# Patient Record
Sex: MALE | Race: WHITE | NOT HISPANIC OR LATINO | Employment: FULL TIME | URBAN - NONMETROPOLITAN AREA
[De-identification: names, ages, dates, MRNs, and addresses within clinical notes are randomized per-mention and may not be internally consistent; named-entity substitution may affect disease eponyms.]

---

## 2021-09-30 ENCOUNTER — HOSPITAL ENCOUNTER (EMERGENCY)
Facility: HOSPITAL | Age: 31
End: 2021-10-01
Attending: EMERGENCY MEDICINE | Admitting: EMERGENCY MEDICINE

## 2021-09-30 DIAGNOSIS — Z00.8 MEDICAL CLEARANCE FOR PSYCHIATRIC ADMISSION: ICD-10-CM

## 2021-09-30 DIAGNOSIS — F22 PARANOIA (HCC): Primary | ICD-10-CM

## 2021-09-30 PROCEDURE — 99285 EMERGENCY DEPT VISIT HI MDM: CPT

## 2021-09-30 PROCEDURE — 82075 ASSAY OF BREATH ETHANOL: CPT | Performed by: EMERGENCY MEDICINE

## 2021-09-30 PROCEDURE — 99285 EMERGENCY DEPT VISIT HI MDM: CPT | Performed by: EMERGENCY MEDICINE

## 2021-10-01 ENCOUNTER — HOSPITAL ENCOUNTER (INPATIENT)
Facility: HOSPITAL | Age: 31
LOS: 11 days | Discharge: HOME | DRG: 753 | End: 2021-10-12
Attending: STUDENT IN AN ORGANIZED HEALTH CARE EDUCATION/TRAINING PROGRAM | Admitting: STUDENT IN AN ORGANIZED HEALTH CARE EDUCATION/TRAINING PROGRAM
Payer: COMMERCIAL

## 2021-10-01 VITALS
TEMPERATURE: 97.6 F | SYSTOLIC BLOOD PRESSURE: 140 MMHG | OXYGEN SATURATION: 96 % | WEIGHT: 194 LBS | DIASTOLIC BLOOD PRESSURE: 84 MMHG | RESPIRATION RATE: 20 BRPM | HEART RATE: 77 BPM

## 2021-10-01 DIAGNOSIS — G47.00 INSOMNIA, UNSPECIFIED TYPE: ICD-10-CM

## 2021-10-01 DIAGNOSIS — Z00.8 MEDICAL CLEARANCE FOR PSYCHIATRIC ADMISSION: ICD-10-CM

## 2021-10-01 DIAGNOSIS — F31.73 BIPOLAR DISORDER, IN PARTIAL REMISSION, MOST RECENT EPISODE MANIC (HCC): Primary | ICD-10-CM

## 2021-10-01 PROBLEM — Z72.0 TOBACCO ABUSE: Status: ACTIVE | Noted: 2021-10-01

## 2021-10-01 PROBLEM — F31.9 BIPOLAR DISORDER (HCC): Status: ACTIVE | Noted: 2021-10-01

## 2021-10-01 PROBLEM — F12.90 MARIJUANA USE: Status: ACTIVE | Noted: 2021-10-01

## 2021-10-01 LAB
ALBUMIN SERPL BCP-MCNC: 4 G/DL (ref 3.5–5)
ALP SERPL-CCNC: 77 U/L (ref 46–116)
ALT SERPL W P-5'-P-CCNC: 27 U/L (ref 12–78)
AMPHETAMINES SERPL QL SCN: NEGATIVE
ANION GAP SERPL CALCULATED.3IONS-SCNC: 4 MMOL/L (ref 4–13)
AST SERPL W P-5'-P-CCNC: 16 U/L (ref 5–45)
BARBITURATES UR QL: NEGATIVE
BASOPHILS # BLD AUTO: 0.06 THOUSANDS/ΜL (ref 0–0.1)
BASOPHILS NFR BLD AUTO: 1 % (ref 0–1)
BENZODIAZ UR QL: NEGATIVE
BILIRUB SERPL-MCNC: 0.28 MG/DL (ref 0.2–1)
BUN SERPL-MCNC: 7 MG/DL (ref 5–25)
CALCIUM SERPL-MCNC: 9.1 MG/DL (ref 8.3–10.1)
CHLORIDE SERPL-SCNC: 103 MMOL/L (ref 100–108)
CO2 SERPL-SCNC: 31 MMOL/L (ref 21–32)
COCAINE UR QL: NEGATIVE
CREAT SERPL-MCNC: 0.95 MG/DL (ref 0.6–1.3)
EOSINOPHIL # BLD AUTO: 0.05 THOUSAND/ΜL (ref 0–0.61)
EOSINOPHIL NFR BLD AUTO: 1 % (ref 0–6)
ERYTHROCYTE [DISTWIDTH] IN BLOOD BY AUTOMATED COUNT: 13.2 % (ref 11.6–15.1)
ETHANOL EXG-MCNC: NORMAL MG/DL
GFR SERPL CREATININE-BSD FRML MDRD: 108 ML/MIN/1.73SQ M
GLUCOSE SERPL-MCNC: 116 MG/DL (ref 65–140)
HCT VFR BLD AUTO: 42.5 % (ref 36.5–49.3)
HGB BLD-MCNC: 14.3 G/DL (ref 12–17)
IMM GRANULOCYTES # BLD AUTO: 0.02 THOUSAND/UL (ref 0–0.2)
IMM GRANULOCYTES NFR BLD AUTO: 0 % (ref 0–2)
LYMPHOCYTES # BLD AUTO: 2.87 THOUSANDS/ΜL (ref 0.6–4.47)
LYMPHOCYTES NFR BLD AUTO: 26 % (ref 14–44)
MCH RBC QN AUTO: 28.8 PG (ref 26.8–34.3)
MCHC RBC AUTO-ENTMCNC: 33.6 G/DL (ref 31.4–37.4)
MCV RBC AUTO: 86 FL (ref 82–98)
METHADONE UR QL: NEGATIVE
MONOCYTES # BLD AUTO: 1.05 THOUSAND/ΜL (ref 0.17–1.22)
MONOCYTES NFR BLD AUTO: 10 % (ref 4–12)
NEUTROPHILS # BLD AUTO: 6.86 THOUSANDS/ΜL (ref 1.85–7.62)
NEUTS SEG NFR BLD AUTO: 62 % (ref 43–75)
NRBC BLD AUTO-RTO: 0 /100 WBCS
OPIATES UR QL SCN: NEGATIVE
OXYCODONE+OXYMORPHONE UR QL SCN: NEGATIVE
PCP UR QL: NEGATIVE
PLATELET # BLD AUTO: 252 THOUSANDS/UL (ref 149–390)
PMV BLD AUTO: 8.4 FL (ref 8.9–12.7)
POTASSIUM SERPL-SCNC: 3.6 MMOL/L (ref 3.5–5.3)
PROT SERPL-MCNC: 7.4 G/DL (ref 6.4–8.2)
RBC # BLD AUTO: 4.96 MILLION/UL (ref 3.88–5.62)
SARS-COV-2 RNA RESP QL NAA+PROBE: NEGATIVE
SODIUM SERPL-SCNC: 138 MMOL/L (ref 136–145)
THC UR QL: POSITIVE
TSH SERPL DL<=0.05 MIU/L-ACNC: 1.23 UIU/ML (ref 0.36–3.74)
VALPROATE SERPL-MCNC: 47 UG/ML (ref 50–100)
WBC # BLD AUTO: 10.91 THOUSAND/UL (ref 4.31–10.16)

## 2021-10-01 PROCEDURE — 99252 IP/OBS CONSLTJ NEW/EST SF 35: CPT | Performed by: INTERNAL MEDICINE

## 2021-10-01 PROCEDURE — 84443 ASSAY THYROID STIM HORMONE: CPT | Performed by: EMERGENCY MEDICINE

## 2021-10-01 PROCEDURE — GZHZZZZ GROUP PSYCHOTHERAPY: ICD-10-PCS | Performed by: STUDENT IN AN ORGANIZED HEALTH CARE EDUCATION/TRAINING PROGRAM

## 2021-10-01 PROCEDURE — 80307 DRUG TEST PRSMV CHEM ANLYZR: CPT | Performed by: EMERGENCY MEDICINE

## 2021-10-01 PROCEDURE — U0005 INFEC AGEN DETEC AMPLI PROBE: HCPCS | Performed by: EMERGENCY MEDICINE

## 2021-10-01 PROCEDURE — 80053 COMPREHEN METABOLIC PANEL: CPT | Performed by: EMERGENCY MEDICINE

## 2021-10-01 PROCEDURE — 80164 ASSAY DIPROPYLACETIC ACD TOT: CPT | Performed by: EMERGENCY MEDICINE

## 2021-10-01 PROCEDURE — U0003 INFECTIOUS AGENT DETECTION BY NUCLEIC ACID (DNA OR RNA); SEVERE ACUTE RESPIRATORY SYNDROME CORONAVIRUS 2 (SARS-COV-2) (CORONAVIRUS DISEASE [COVID-19]), AMPLIFIED PROBE TECHNIQUE, MAKING USE OF HIGH THROUGHPUT TECHNOLOGIES AS DESCRIBED BY CMS-2020-01-R: HCPCS | Performed by: EMERGENCY MEDICINE

## 2021-10-01 PROCEDURE — 36415 COLL VENOUS BLD VENIPUNCTURE: CPT | Performed by: EMERGENCY MEDICINE

## 2021-10-01 PROCEDURE — 85025 COMPLETE CBC W/AUTO DIFF WBC: CPT | Performed by: EMERGENCY MEDICINE

## 2021-10-01 RX ORDER — HYDROXYZINE HYDROCHLORIDE 25 MG/1
25 TABLET, FILM COATED ORAL
Status: CANCELLED | OUTPATIENT
Start: 2021-10-01

## 2021-10-01 RX ORDER — HYDROXYZINE HYDROCHLORIDE 25 MG/1
100 TABLET, FILM COATED ORAL
Status: CANCELLED | OUTPATIENT
Start: 2021-10-01

## 2021-10-01 RX ORDER — OLANZAPINE 5 MG/1
5 TABLET ORAL
Status: DISCONTINUED | OUTPATIENT
Start: 2021-10-01 | End: 2021-10-12 | Stop reason: HOSPADM

## 2021-10-01 RX ORDER — MAGNESIUM HYDROXIDE/ALUMINUM HYDROXICE/SIMETHICONE 120; 1200; 1200 MG/30ML; MG/30ML; MG/30ML
30 SUSPENSION ORAL EVERY 4 HOURS PRN
Status: DISCONTINUED | OUTPATIENT
Start: 2021-10-01 | End: 2021-10-12 | Stop reason: HOSPADM

## 2021-10-01 RX ORDER — DIVALPROEX SODIUM 500 MG/1
500 TABLET, EXTENDED RELEASE ORAL
Status: DISCONTINUED | OUTPATIENT
Start: 2021-10-01 | End: 2021-10-04

## 2021-10-01 RX ORDER — OLANZAPINE 2.5 MG/1
5 TABLET ORAL
Status: CANCELLED | OUTPATIENT
Start: 2021-10-01

## 2021-10-01 RX ORDER — OLANZAPINE 10 MG/1
10 INJECTION, POWDER, LYOPHILIZED, FOR SOLUTION INTRAMUSCULAR
Status: CANCELLED | OUTPATIENT
Start: 2021-10-01

## 2021-10-01 RX ORDER — BENZTROPINE MESYLATE 1 MG/ML
1 INJECTION INTRAMUSCULAR; INTRAVENOUS
Status: DISCONTINUED | OUTPATIENT
Start: 2021-10-01 | End: 2021-10-12 | Stop reason: HOSPADM

## 2021-10-01 RX ORDER — POLYETHYLENE GLYCOL 3350 17 G/17G
17 POWDER, FOR SOLUTION ORAL DAILY PRN
Status: DISCONTINUED | OUTPATIENT
Start: 2021-10-01 | End: 2021-10-12 | Stop reason: HOSPADM

## 2021-10-01 RX ORDER — VALPROIC ACID 250 MG/1
500 CAPSULE, LIQUID FILLED ORAL ONCE
Status: DISCONTINUED | OUTPATIENT
Start: 2021-10-01 | End: 2021-10-01 | Stop reason: HOSPADM

## 2021-10-01 RX ORDER — HYDROXYZINE 50 MG/1
50 TABLET, FILM COATED ORAL
Status: DISCONTINUED | OUTPATIENT
Start: 2021-10-01 | End: 2021-10-12 | Stop reason: HOSPADM

## 2021-10-01 RX ORDER — IBUPROFEN 800 MG/1
800 TABLET ORAL EVERY 8 HOURS PRN
Status: CANCELLED | OUTPATIENT
Start: 2021-10-01

## 2021-10-01 RX ORDER — OLANZAPINE 10 MG/1
10 TABLET ORAL
Status: DISCONTINUED | OUTPATIENT
Start: 2021-10-01 | End: 2021-10-12 | Stop reason: HOSPADM

## 2021-10-01 RX ORDER — IBUPROFEN 400 MG/1
800 TABLET ORAL EVERY 8 HOURS PRN
Status: DISCONTINUED | OUTPATIENT
Start: 2021-10-01 | End: 2021-10-12 | Stop reason: HOSPADM

## 2021-10-01 RX ORDER — LORAZEPAM 1 MG/1
2 TABLET ORAL ONCE
Status: COMPLETED | OUTPATIENT
Start: 2021-10-01 | End: 2021-10-01

## 2021-10-01 RX ORDER — DIPHENHYDRAMINE HYDROCHLORIDE 50 MG/ML
50 INJECTION INTRAMUSCULAR; INTRAVENOUS EVERY 6 HOURS PRN
Status: CANCELLED | OUTPATIENT
Start: 2021-10-01

## 2021-10-01 RX ORDER — OLANZAPINE 2.5 MG/1
2.5 TABLET ORAL
Status: DISCONTINUED | OUTPATIENT
Start: 2021-10-01 | End: 2021-10-12 | Stop reason: HOSPADM

## 2021-10-01 RX ORDER — BENZTROPINE MESYLATE 1 MG/1
1 TABLET ORAL
Status: DISCONTINUED | OUTPATIENT
Start: 2021-10-01 | End: 2021-10-12 | Stop reason: HOSPADM

## 2021-10-01 RX ORDER — MAGNESIUM HYDROXIDE/ALUMINUM HYDROXICE/SIMETHICONE 120; 1200; 1200 MG/30ML; MG/30ML; MG/30ML
30 SUSPENSION ORAL EVERY 4 HOURS PRN
Status: CANCELLED | OUTPATIENT
Start: 2021-10-01

## 2021-10-01 RX ORDER — BENZTROPINE MESYLATE 1 MG/ML
1 INJECTION INTRAMUSCULAR; INTRAVENOUS
Status: CANCELLED | OUTPATIENT
Start: 2021-10-01

## 2021-10-01 RX ORDER — OLANZAPINE 2.5 MG/1
2.5 TABLET ORAL
Status: CANCELLED | OUTPATIENT
Start: 2021-10-01

## 2021-10-01 RX ORDER — LANOLIN ALCOHOL/MO/W.PET/CERES
3 CREAM (GRAM) TOPICAL
Status: CANCELLED | OUTPATIENT
Start: 2021-10-01

## 2021-10-01 RX ORDER — DIVALPROEX SODIUM 250 MG/1
250 TABLET, DELAYED RELEASE ORAL
COMMUNITY
Start: 2021-09-15 | End: 2021-10-12 | Stop reason: HOSPADM

## 2021-10-01 RX ORDER — LANOLIN ALCOHOL/MO/W.PET/CERES
3 CREAM (GRAM) TOPICAL
Status: DISCONTINUED | OUTPATIENT
Start: 2021-10-01 | End: 2021-10-12 | Stop reason: HOSPADM

## 2021-10-01 RX ORDER — MINERAL OIL AND PETROLATUM 150; 830 MG/G; MG/G
1 OINTMENT OPHTHALMIC
Status: DISCONTINUED | OUTPATIENT
Start: 2021-10-01 | End: 2021-10-12 | Stop reason: HOSPADM

## 2021-10-01 RX ORDER — QUETIAPINE FUMARATE 50 MG/1
50 TABLET, FILM COATED ORAL
Status: DISCONTINUED | OUTPATIENT
Start: 2021-10-01 | End: 2021-10-02

## 2021-10-01 RX ORDER — MINERAL OIL AND PETROLATUM 150; 830 MG/G; MG/G
1 OINTMENT OPHTHALMIC
Status: CANCELLED | OUTPATIENT
Start: 2021-10-01

## 2021-10-01 RX ORDER — HYDROXYZINE HYDROCHLORIDE 25 MG/1
25 TABLET, FILM COATED ORAL
Status: DISCONTINUED | OUTPATIENT
Start: 2021-10-01 | End: 2021-10-12 | Stop reason: HOSPADM

## 2021-10-01 RX ORDER — IBUPROFEN 400 MG/1
400 TABLET ORAL EVERY 4 HOURS PRN
Status: DISCONTINUED | OUTPATIENT
Start: 2021-10-01 | End: 2021-10-12 | Stop reason: HOSPADM

## 2021-10-01 RX ORDER — LORAZEPAM 2 MG/ML
2 INJECTION INTRAMUSCULAR EVERY 6 HOURS PRN
Status: CANCELLED | OUTPATIENT
Start: 2021-10-01

## 2021-10-01 RX ORDER — OLANZAPINE 10 MG/1
5 INJECTION, POWDER, LYOPHILIZED, FOR SOLUTION INTRAMUSCULAR
Status: CANCELLED | OUTPATIENT
Start: 2021-10-01

## 2021-10-01 RX ORDER — TRAZODONE HYDROCHLORIDE 50 MG/1
50 TABLET ORAL
Status: CANCELLED | OUTPATIENT
Start: 2021-10-01

## 2021-10-01 RX ORDER — HYDROXYZINE HYDROCHLORIDE 25 MG/1
50 TABLET, FILM COATED ORAL
Status: CANCELLED | OUTPATIENT
Start: 2021-10-01

## 2021-10-01 RX ORDER — HYDROXYZINE 50 MG/1
100 TABLET, FILM COATED ORAL
Status: DISCONTINUED | OUTPATIENT
Start: 2021-10-01 | End: 2021-10-12 | Stop reason: HOSPADM

## 2021-10-01 RX ORDER — VALPROIC ACID 250 MG/1
250 CAPSULE, LIQUID FILLED ORAL ONCE
Status: COMPLETED | OUTPATIENT
Start: 2021-10-01 | End: 2021-10-01

## 2021-10-01 RX ORDER — QUETIAPINE FUMARATE 25 MG/1
50 TABLET, FILM COATED ORAL ONCE
Status: COMPLETED | OUTPATIENT
Start: 2021-10-01 | End: 2021-10-01

## 2021-10-01 RX ORDER — TRAZODONE HYDROCHLORIDE 50 MG/1
50 TABLET ORAL
Status: DISCONTINUED | OUTPATIENT
Start: 2021-10-01 | End: 2021-10-12 | Stop reason: HOSPADM

## 2021-10-01 RX ORDER — PROPRANOLOL HYDROCHLORIDE 20 MG/1
10 TABLET ORAL EVERY 8 HOURS PRN
Status: CANCELLED | OUTPATIENT
Start: 2021-10-01

## 2021-10-01 RX ORDER — IBUPROFEN 600 MG/1
600 TABLET ORAL EVERY 6 HOURS PRN
Status: DISCONTINUED | OUTPATIENT
Start: 2021-10-01 | End: 2021-10-12 | Stop reason: HOSPADM

## 2021-10-01 RX ORDER — BENZTROPINE MESYLATE 1 MG/1
1 TABLET ORAL
Status: CANCELLED | OUTPATIENT
Start: 2021-10-01

## 2021-10-01 RX ORDER — OLANZAPINE 10 MG/1
10 TABLET ORAL
Status: CANCELLED | OUTPATIENT
Start: 2021-10-01

## 2021-10-01 RX ORDER — QUETIAPINE FUMARATE 50 MG/1
50 TABLET, FILM COATED ORAL
COMMUNITY
Start: 2021-09-15 | End: 2021-10-12 | Stop reason: HOSPADM

## 2021-10-01 RX ORDER — POLYETHYLENE GLYCOL 3350 17 G/17G
17 POWDER, FOR SOLUTION ORAL DAILY PRN
Status: CANCELLED | OUTPATIENT
Start: 2021-10-01

## 2021-10-01 RX ORDER — AMOXICILLIN 250 MG
1 CAPSULE ORAL DAILY PRN
Status: CANCELLED | OUTPATIENT
Start: 2021-10-01

## 2021-10-01 RX ORDER — OLANZAPINE 10 MG/1
5 INJECTION, POWDER, LYOPHILIZED, FOR SOLUTION INTRAMUSCULAR
Status: DISCONTINUED | OUTPATIENT
Start: 2021-10-01 | End: 2021-10-12 | Stop reason: HOSPADM

## 2021-10-01 RX ORDER — LORAZEPAM 2 MG/ML
2 INJECTION INTRAMUSCULAR EVERY 6 HOURS PRN
Status: DISCONTINUED | OUTPATIENT
Start: 2021-10-01 | End: 2021-10-12 | Stop reason: HOSPADM

## 2021-10-01 RX ORDER — IBUPROFEN 400 MG/1
400 TABLET ORAL EVERY 4 HOURS PRN
Status: CANCELLED | OUTPATIENT
Start: 2021-10-01

## 2021-10-01 RX ORDER — NICOTINE 21 MG/24HR
1 PATCH, TRANSDERMAL 24 HOURS TRANSDERMAL DAILY
Status: CANCELLED | OUTPATIENT
Start: 2021-10-01

## 2021-10-01 RX ORDER — OLANZAPINE 10 MG/1
10 INJECTION, POWDER, LYOPHILIZED, FOR SOLUTION INTRAMUSCULAR
Status: DISCONTINUED | OUTPATIENT
Start: 2021-10-01 | End: 2021-10-12 | Stop reason: HOSPADM

## 2021-10-01 RX ORDER — DIVALPROEX SODIUM 500 MG/1
500 TABLET, DELAYED RELEASE ORAL
COMMUNITY
Start: 2021-09-15 | End: 2021-10-12 | Stop reason: HOSPADM

## 2021-10-01 RX ORDER — AMOXICILLIN 250 MG
1 CAPSULE ORAL DAILY PRN
Status: DISCONTINUED | OUTPATIENT
Start: 2021-10-01 | End: 2021-10-12 | Stop reason: HOSPADM

## 2021-10-01 RX ORDER — DIPHENHYDRAMINE HYDROCHLORIDE 50 MG/ML
50 INJECTION INTRAMUSCULAR; INTRAVENOUS EVERY 6 HOURS PRN
Status: DISCONTINUED | OUTPATIENT
Start: 2021-10-01 | End: 2021-10-12 | Stop reason: HOSPADM

## 2021-10-01 RX ORDER — NICOTINE 21 MG/24HR
14 PATCH, TRANSDERMAL 24 HOURS TRANSDERMAL ONCE
Status: DISCONTINUED | OUTPATIENT
Start: 2021-10-01 | End: 2021-10-01 | Stop reason: HOSPADM

## 2021-10-01 RX ORDER — NICOTINE 21 MG/24HR
1 PATCH, TRANSDERMAL 24 HOURS TRANSDERMAL DAILY
Status: DISCONTINUED | OUTPATIENT
Start: 2021-10-02 | End: 2021-10-01

## 2021-10-01 RX ORDER — IBUPROFEN 600 MG/1
600 TABLET ORAL EVERY 6 HOURS PRN
Status: CANCELLED | OUTPATIENT
Start: 2021-10-01

## 2021-10-01 RX ORDER — PROPRANOLOL HYDROCHLORIDE 10 MG/1
10 TABLET ORAL EVERY 8 HOURS PRN
Status: DISCONTINUED | OUTPATIENT
Start: 2021-10-01 | End: 2021-10-12 | Stop reason: HOSPADM

## 2021-10-01 RX ADMIN — NICOTINE 14 MG: 14 PATCH, EXTENDED RELEASE TRANSDERMAL at 04:14

## 2021-10-01 RX ADMIN — NICOTINE POLACRILEX 2 MG: 2 GUM, CHEWING ORAL at 21:23

## 2021-10-01 RX ADMIN — QUETIAPINE FUMARATE 50 MG: 50 TABLET ORAL at 21:06

## 2021-10-01 RX ADMIN — VALPROIC ACID 250 MG: 250 CAPSULE ORAL at 09:15

## 2021-10-01 RX ADMIN — LORAZEPAM 2 MG: 1 TABLET ORAL at 13:11

## 2021-10-01 RX ADMIN — QUETIAPINE FUMARATE 50 MG: 25 TABLET ORAL at 04:32

## 2021-10-01 RX ADMIN — DIVALPROEX SODIUM 500 MG: 500 TABLET, EXTENDED RELEASE ORAL at 21:06

## 2021-10-02 PROBLEM — F31.11 BIPOLAR 1 DISORDER, MANIC, MILD (HCC): Status: ACTIVE | Noted: 2021-10-02

## 2021-10-02 LAB
25(OH)D3 SERPL-MCNC: 24.3 NG/ML (ref 30–100)
CHOLEST SERPL-MCNC: 126 MG/DL
FOLATE SERPL-MCNC: 17.7 NG/ML (ref 3.1–17.5)
HDLC SERPL-MCNC: 32 MG/DL
LDLC SERPL CALC-MCNC: 69 MG/DL
NONHDLC SERPL-MCNC: 94 MG/DL
TRIGL SERPL-MCNC: 126 MG/DL
VIT B12 SERPL-MCNC: 369 PG/ML (ref 100–900)

## 2021-10-02 PROCEDURE — 82306 VITAMIN D 25 HYDROXY: CPT | Performed by: STUDENT IN AN ORGANIZED HEALTH CARE EDUCATION/TRAINING PROGRAM

## 2021-10-02 PROCEDURE — 86592 SYPHILIS TEST NON-TREP QUAL: CPT | Performed by: STUDENT IN AN ORGANIZED HEALTH CARE EDUCATION/TRAINING PROGRAM

## 2021-10-02 PROCEDURE — 82607 VITAMIN B-12: CPT | Performed by: STUDENT IN AN ORGANIZED HEALTH CARE EDUCATION/TRAINING PROGRAM

## 2021-10-02 PROCEDURE — 82746 ASSAY OF FOLIC ACID SERUM: CPT | Performed by: STUDENT IN AN ORGANIZED HEALTH CARE EDUCATION/TRAINING PROGRAM

## 2021-10-02 PROCEDURE — 80061 LIPID PANEL: CPT | Performed by: STUDENT IN AN ORGANIZED HEALTH CARE EDUCATION/TRAINING PROGRAM

## 2021-10-02 PROCEDURE — 93005 ELECTROCARDIOGRAM TRACING: CPT

## 2021-10-02 PROCEDURE — 99222 1ST HOSP IP/OBS MODERATE 55: CPT | Performed by: PSYCHIATRY & NEUROLOGY

## 2021-10-02 RX ORDER — DIVALPROEX SODIUM 250 MG/1
250 TABLET, EXTENDED RELEASE ORAL DAILY
Status: DISCONTINUED | OUTPATIENT
Start: 2021-10-03 | End: 2021-10-05

## 2021-10-02 RX ORDER — QUETIAPINE FUMARATE 100 MG/1
100 TABLET, FILM COATED ORAL
Status: DISCONTINUED | OUTPATIENT
Start: 2021-10-02 | End: 2021-10-04

## 2021-10-02 RX ADMIN — DIVALPROEX SODIUM 500 MG: 500 TABLET, EXTENDED RELEASE ORAL at 21:05

## 2021-10-02 RX ADMIN — NICOTINE POLACRILEX 2 MG: 2 GUM, CHEWING ORAL at 16:28

## 2021-10-02 RX ADMIN — QUETIAPINE FUMARATE 100 MG: 100 TABLET ORAL at 21:05

## 2021-10-02 RX ADMIN — HYDROXYZINE HYDROCHLORIDE 100 MG: 50 TABLET, FILM COATED ORAL at 13:49

## 2021-10-02 RX ADMIN — NICOTINE POLACRILEX 2 MG: 2 GUM, CHEWING ORAL at 06:26

## 2021-10-02 RX ADMIN — OLANZAPINE 10 MG: 10 TABLET, FILM COATED ORAL at 18:12

## 2021-10-03 LAB
ATRIAL RATE: 70 BPM
P AXIS: 39 DEGREES
PR INTERVAL: 152 MS
QRS AXIS: 66 DEGREES
QRSD INTERVAL: 102 MS
QT INTERVAL: 396 MS
QTC INTERVAL: 427 MS
T WAVE AXIS: 47 DEGREES
VENTRICULAR RATE: 70 BPM

## 2021-10-03 PROCEDURE — 99232 SBSQ HOSP IP/OBS MODERATE 35: CPT | Performed by: PSYCHIATRY & NEUROLOGY

## 2021-10-03 PROCEDURE — 93010 ELECTROCARDIOGRAM REPORT: CPT | Performed by: INTERNAL MEDICINE

## 2021-10-03 RX ADMIN — NICOTINE POLACRILEX 2 MG: 2 GUM, CHEWING ORAL at 09:10

## 2021-10-03 RX ADMIN — NICOTINE POLACRILEX 2 MG: 2 GUM, CHEWING ORAL at 10:17

## 2021-10-03 RX ADMIN — DIVALPROEX SODIUM 250 MG: 250 TABLET, EXTENDED RELEASE ORAL at 09:10

## 2021-10-03 RX ADMIN — DIVALPROEX SODIUM 500 MG: 500 TABLET, EXTENDED RELEASE ORAL at 20:24

## 2021-10-03 RX ADMIN — QUETIAPINE FUMARATE 100 MG: 100 TABLET ORAL at 20:25

## 2021-10-03 RX ADMIN — NICOTINE POLACRILEX 2 MG: 2 GUM, CHEWING ORAL at 21:39

## 2021-10-03 RX ADMIN — NICOTINE POLACRILEX 2 MG: 2 GUM, CHEWING ORAL at 12:51

## 2021-10-03 RX ADMIN — NICOTINE POLACRILEX 2 MG: 2 GUM, CHEWING ORAL at 16:07

## 2021-10-03 RX ADMIN — NICOTINE POLACRILEX 2 MG: 2 GUM, CHEWING ORAL at 18:54

## 2021-10-04 LAB — RPR SER QL: NORMAL

## 2021-10-04 PROCEDURE — 99232 SBSQ HOSP IP/OBS MODERATE 35: CPT | Performed by: STUDENT IN AN ORGANIZED HEALTH CARE EDUCATION/TRAINING PROGRAM

## 2021-10-04 RX ORDER — QUETIAPINE FUMARATE 100 MG/1
100 TABLET, FILM COATED ORAL 2 TIMES DAILY
Status: DISCONTINUED | OUTPATIENT
Start: 2021-10-04 | End: 2021-10-06

## 2021-10-04 RX ORDER — DIVALPROEX SODIUM 500 MG/1
500 TABLET, EXTENDED RELEASE ORAL
Status: DISCONTINUED | OUTPATIENT
Start: 2021-10-04 | End: 2021-10-05

## 2021-10-04 RX ADMIN — QUETIAPINE FUMARATE 100 MG: 100 TABLET ORAL at 21:00

## 2021-10-04 RX ADMIN — MELATONIN TAB 3 MG 3 MG: 3 TAB at 21:00

## 2021-10-04 RX ADMIN — DIVALPROEX SODIUM 500 MG: 500 TABLET, EXTENDED RELEASE ORAL at 21:00

## 2021-10-04 RX ADMIN — QUETIAPINE FUMARATE 100 MG: 100 TABLET ORAL at 12:37

## 2021-10-04 RX ADMIN — HYDROXYZINE HYDROCHLORIDE 50 MG: 50 TABLET, FILM COATED ORAL at 02:25

## 2021-10-04 RX ADMIN — NICOTINE POLACRILEX 2 MG: 2 GUM, CHEWING ORAL at 06:18

## 2021-10-04 RX ADMIN — NICOTINE POLACRILEX 2 MG: 2 GUM, CHEWING ORAL at 15:28

## 2021-10-04 RX ADMIN — NICOTINE POLACRILEX 2 MG: 2 GUM, CHEWING ORAL at 18:51

## 2021-10-04 RX ADMIN — NICOTINE POLACRILEX 2 MG: 2 GUM, CHEWING ORAL at 16:50

## 2021-10-04 RX ADMIN — DIVALPROEX SODIUM 250 MG: 250 TABLET, EXTENDED RELEASE ORAL at 08:10

## 2021-10-04 RX ADMIN — NICOTINE POLACRILEX 2 MG: 2 GUM, CHEWING ORAL at 09:25

## 2021-10-05 LAB — VALPROATE SERPL-MCNC: 26 UG/ML (ref 50–120)

## 2021-10-05 PROCEDURE — 99232 SBSQ HOSP IP/OBS MODERATE 35: CPT | Performed by: STUDENT IN AN ORGANIZED HEALTH CARE EDUCATION/TRAINING PROGRAM

## 2021-10-05 PROCEDURE — 80164 ASSAY DIPROPYLACETIC ACD TOT: CPT | Performed by: PSYCHIATRY & NEUROLOGY

## 2021-10-05 RX ORDER — DIVALPROEX SODIUM 500 MG/1
1000 TABLET, EXTENDED RELEASE ORAL
Status: DISCONTINUED | OUTPATIENT
Start: 2021-10-05 | End: 2021-10-10

## 2021-10-05 RX ADMIN — DIVALPROEX SODIUM 250 MG: 250 TABLET, EXTENDED RELEASE ORAL at 08:19

## 2021-10-05 RX ADMIN — DIVALPROEX SODIUM 1000 MG: 500 TABLET, EXTENDED RELEASE ORAL at 20:00

## 2021-10-05 RX ADMIN — NICOTINE POLACRILEX 2 MG: 2 GUM, CHEWING ORAL at 20:00

## 2021-10-05 RX ADMIN — NICOTINE POLACRILEX 2 MG: 2 GUM, CHEWING ORAL at 07:32

## 2021-10-05 RX ADMIN — NICOTINE POLACRILEX 2 MG: 2 GUM, CHEWING ORAL at 05:18

## 2021-10-05 RX ADMIN — MELATONIN TAB 3 MG 3 MG: 3 TAB at 21:14

## 2021-10-05 RX ADMIN — QUETIAPINE FUMARATE 100 MG: 100 TABLET ORAL at 12:50

## 2021-10-05 RX ADMIN — QUETIAPINE FUMARATE 100 MG: 100 TABLET ORAL at 20:00

## 2021-10-05 RX ADMIN — NICOTINE POLACRILEX 2 MG: 2 GUM, CHEWING ORAL at 13:49

## 2021-10-05 RX ADMIN — NICOTINE POLACRILEX 2 MG: 2 GUM, CHEWING ORAL at 22:06

## 2021-10-05 RX ADMIN — NICOTINE POLACRILEX 2 MG: 2 GUM, CHEWING ORAL at 15:59

## 2021-10-05 RX ADMIN — NICOTINE POLACRILEX 2 MG: 2 GUM, CHEWING ORAL at 09:41

## 2021-10-06 PROCEDURE — 99232 SBSQ HOSP IP/OBS MODERATE 35: CPT | Performed by: STUDENT IN AN ORGANIZED HEALTH CARE EDUCATION/TRAINING PROGRAM

## 2021-10-06 RX ORDER — QUETIAPINE FUMARATE 100 MG/1
100 TABLET, FILM COATED ORAL 2 TIMES DAILY
Status: DISCONTINUED | OUTPATIENT
Start: 2021-10-06 | End: 2021-10-08

## 2021-10-06 RX ADMIN — NICOTINE POLACRILEX 2 MG: 2 GUM, CHEWING ORAL at 17:31

## 2021-10-06 RX ADMIN — NICOTINE POLACRILEX 2 MG: 2 GUM, CHEWING ORAL at 12:42

## 2021-10-06 RX ADMIN — MELATONIN TAB 3 MG 3 MG: 3 TAB at 21:03

## 2021-10-06 RX ADMIN — NICOTINE POLACRILEX 2 MG: 2 GUM, CHEWING ORAL at 09:59

## 2021-10-06 RX ADMIN — NICOTINE POLACRILEX 2 MG: 2 GUM, CHEWING ORAL at 20:04

## 2021-10-06 RX ADMIN — DIVALPROEX SODIUM 1000 MG: 500 TABLET, EXTENDED RELEASE ORAL at 20:03

## 2021-10-06 RX ADMIN — QUETIAPINE FUMARATE 100 MG: 100 TABLET ORAL at 12:32

## 2021-10-06 RX ADMIN — OLANZAPINE 10 MG: 10 TABLET, FILM COATED ORAL at 08:11

## 2021-10-06 RX ADMIN — NICOTINE POLACRILEX 2 MG: 2 GUM, CHEWING ORAL at 07:06

## 2021-10-06 RX ADMIN — QUETIAPINE FUMARATE 100 MG: 100 TABLET ORAL at 20:03

## 2021-10-07 PROCEDURE — 99232 SBSQ HOSP IP/OBS MODERATE 35: CPT | Performed by: STUDENT IN AN ORGANIZED HEALTH CARE EDUCATION/TRAINING PROGRAM

## 2021-10-07 RX ADMIN — QUETIAPINE FUMARATE 100 MG: 100 TABLET ORAL at 20:04

## 2021-10-07 RX ADMIN — NICOTINE POLACRILEX 2 MG: 2 GUM, CHEWING ORAL at 17:26

## 2021-10-07 RX ADMIN — NICOTINE POLACRILEX 2 MG: 2 GUM, CHEWING ORAL at 10:14

## 2021-10-07 RX ADMIN — DIVALPROEX SODIUM 1000 MG: 500 TABLET, EXTENDED RELEASE ORAL at 20:04

## 2021-10-07 RX ADMIN — OLANZAPINE 10 MG: 10 TABLET, FILM COATED ORAL at 11:38

## 2021-10-07 RX ADMIN — NICOTINE POLACRILEX 2 MG: 2 GUM, CHEWING ORAL at 19:30

## 2021-10-07 RX ADMIN — NICOTINE POLACRILEX 2 MG: 2 GUM, CHEWING ORAL at 06:57

## 2021-10-07 RX ADMIN — QUETIAPINE FUMARATE 100 MG: 100 TABLET ORAL at 08:01

## 2021-10-07 RX ADMIN — MELATONIN TAB 3 MG 3 MG: 3 TAB at 21:15

## 2021-10-08 PROCEDURE — 99232 SBSQ HOSP IP/OBS MODERATE 35: CPT | Performed by: STUDENT IN AN ORGANIZED HEALTH CARE EDUCATION/TRAINING PROGRAM

## 2021-10-08 RX ORDER — QUETIAPINE FUMARATE 100 MG/1
100 TABLET, FILM COATED ORAL 3 TIMES DAILY
Status: DISCONTINUED | OUTPATIENT
Start: 2021-10-08 | End: 2021-10-12 | Stop reason: HOSPADM

## 2021-10-08 RX ADMIN — NICOTINE POLACRILEX 2 MG: 2 GUM, CHEWING ORAL at 16:59

## 2021-10-08 RX ADMIN — QUETIAPINE FUMARATE 100 MG: 100 TABLET ORAL at 08:06

## 2021-10-08 RX ADMIN — NICOTINE POLACRILEX 2 MG: 2 GUM, CHEWING ORAL at 08:06

## 2021-10-08 RX ADMIN — QUETIAPINE FUMARATE 100 MG: 100 TABLET ORAL at 20:17

## 2021-10-08 RX ADMIN — MELATONIN TAB 3 MG 3 MG: 3 TAB at 21:39

## 2021-10-08 RX ADMIN — DIVALPROEX SODIUM 1000 MG: 500 TABLET, EXTENDED RELEASE ORAL at 20:17

## 2021-10-08 RX ADMIN — NICOTINE POLACRILEX 2 MG: 2 GUM, CHEWING ORAL at 20:19

## 2021-10-08 RX ADMIN — LORAZEPAM 2 MG: 2 INJECTION INTRAMUSCULAR; INTRAVENOUS at 08:23

## 2021-10-08 RX ADMIN — NICOTINE POLACRILEX 2 MG: 2 GUM, CHEWING ORAL at 05:58

## 2021-10-08 RX ADMIN — QUETIAPINE FUMARATE 100 MG: 100 TABLET ORAL at 14:15

## 2021-10-08 RX ADMIN — NICOTINE POLACRILEX 2 MG: 2 GUM, CHEWING ORAL at 14:58

## 2021-10-08 RX ADMIN — NICOTINE POLACRILEX 2 MG: 2 GUM, CHEWING ORAL at 12:03

## 2021-10-09 LAB — VALPROATE SERPL-MCNC: 36.4 UG/ML (ref 50–120)

## 2021-10-09 PROCEDURE — 80164 ASSAY DIPROPYLACETIC ACD TOT: CPT

## 2021-10-09 PROCEDURE — 99232 SBSQ HOSP IP/OBS MODERATE 35: CPT | Performed by: STUDENT IN AN ORGANIZED HEALTH CARE EDUCATION/TRAINING PROGRAM

## 2021-10-09 RX ADMIN — QUETIAPINE FUMARATE 100 MG: 100 TABLET ORAL at 20:50

## 2021-10-09 RX ADMIN — MELATONIN TAB 3 MG 3 MG: 3 TAB at 20:50

## 2021-10-09 RX ADMIN — NICOTINE POLACRILEX 2 MG: 2 GUM, CHEWING ORAL at 16:36

## 2021-10-09 RX ADMIN — QUETIAPINE FUMARATE 100 MG: 100 TABLET ORAL at 14:40

## 2021-10-09 RX ADMIN — DIVALPROEX SODIUM 1000 MG: 500 TABLET, EXTENDED RELEASE ORAL at 20:49

## 2021-10-09 RX ADMIN — NICOTINE POLACRILEX 2 MG: 2 GUM, CHEWING ORAL at 09:01

## 2021-10-09 RX ADMIN — NICOTINE POLACRILEX 2 MG: 2 GUM, CHEWING ORAL at 21:08

## 2021-10-09 RX ADMIN — QUETIAPINE FUMARATE 100 MG: 100 TABLET ORAL at 09:01

## 2021-10-09 RX ADMIN — NICOTINE POLACRILEX 2 MG: 2 GUM, CHEWING ORAL at 05:40

## 2021-10-10 PROCEDURE — 99232 SBSQ HOSP IP/OBS MODERATE 35: CPT | Performed by: STUDENT IN AN ORGANIZED HEALTH CARE EDUCATION/TRAINING PROGRAM

## 2021-10-10 RX ORDER — MELATONIN
1000 DAILY
Status: DISCONTINUED | OUTPATIENT
Start: 2021-10-10 | End: 2021-10-12 | Stop reason: HOSPADM

## 2021-10-10 RX ADMIN — NICOTINE POLACRILEX 2 MG: 2 GUM, CHEWING ORAL at 08:30

## 2021-10-10 RX ADMIN — CHOLECALCIFEROL TAB 25 MCG (1000 UNIT) 1000 UNITS: 25 TAB at 13:13

## 2021-10-10 RX ADMIN — NICOTINE POLACRILEX 2 MG: 2 GUM, CHEWING ORAL at 16:36

## 2021-10-10 RX ADMIN — DIVALPROEX SODIUM 1250 MG: 500 TABLET, EXTENDED RELEASE ORAL at 21:00

## 2021-10-10 RX ADMIN — NICOTINE POLACRILEX 2 MG: 2 GUM, CHEWING ORAL at 11:09

## 2021-10-10 RX ADMIN — NICOTINE POLACRILEX 2 MG: 2 GUM, CHEWING ORAL at 19:34

## 2021-10-10 RX ADMIN — NICOTINE POLACRILEX 2 MG: 2 GUM, CHEWING ORAL at 13:14

## 2021-10-10 RX ADMIN — QUETIAPINE FUMARATE 100 MG: 100 TABLET ORAL at 21:00

## 2021-10-10 RX ADMIN — IBUPROFEN 600 MG: 600 TABLET, FILM COATED ORAL at 14:54

## 2021-10-10 RX ADMIN — QUETIAPINE FUMARATE 100 MG: 100 TABLET ORAL at 08:30

## 2021-10-10 RX ADMIN — MELATONIN TAB 3 MG 3 MG: 3 TAB at 21:08

## 2021-10-10 RX ADMIN — NICOTINE POLACRILEX 2 MG: 2 GUM, CHEWING ORAL at 06:26

## 2021-10-10 RX ADMIN — QUETIAPINE FUMARATE 100 MG: 100 TABLET ORAL at 13:13

## 2021-10-11 LAB
ALBUMIN SERPL BCP-MCNC: 4.1 G/DL (ref 3–5.2)
ALP SERPL-CCNC: 55 U/L (ref 43–122)
ALT SERPL W P-5'-P-CCNC: 17 U/L
AST SERPL W P-5'-P-CCNC: 25 U/L (ref 17–59)
BASOPHILS # BLD AUTO: 0 THOUSANDS/ΜL (ref 0–0.1)
BASOPHILS NFR BLD AUTO: 0 % (ref 0–1)
BILIRUB DIRECT SERPL-MCNC: 0.35 MG/DL
BILIRUB SERPL-MCNC: 0.58 MG/DL
EOSINOPHIL # BLD AUTO: 0.1 THOUSAND/ΜL (ref 0–0.4)
EOSINOPHIL NFR BLD AUTO: 2 % (ref 0–6)
ERYTHROCYTE [DISTWIDTH] IN BLOOD BY AUTOMATED COUNT: 14.5 %
HCT VFR BLD AUTO: 41.8 % (ref 41–53)
HGB BLD-MCNC: 14.2 G/DL (ref 13.5–17.5)
LYMPHOCYTES # BLD AUTO: 2.2 THOUSANDS/ΜL (ref 0.5–4)
LYMPHOCYTES NFR BLD AUTO: 30 % (ref 25–45)
MCH RBC QN AUTO: 29.2 PG (ref 26–34)
MCHC RBC AUTO-ENTMCNC: 34.1 G/DL (ref 31–36)
MCV RBC AUTO: 86 FL (ref 80–100)
MONOCYTES # BLD AUTO: 0.8 THOUSAND/ΜL (ref 0.2–0.9)
MONOCYTES NFR BLD AUTO: 10 % (ref 1–10)
NEUTROPHILS # BLD AUTO: 4.3 THOUSANDS/ΜL (ref 1.8–7.8)
NEUTS SEG NFR BLD AUTO: 58 % (ref 45–65)
PLATELET # BLD AUTO: 219 THOUSANDS/UL (ref 150–450)
PMV BLD AUTO: 7 FL (ref 8.9–12.7)
PROT SERPL-MCNC: 6.8 G/DL (ref 5.9–8.4)
RBC # BLD AUTO: 4.87 MILLION/UL (ref 4.5–5.9)
WBC # BLD AUTO: 7.4 THOUSAND/UL (ref 4.5–11)

## 2021-10-11 PROCEDURE — 85025 COMPLETE CBC W/AUTO DIFF WBC: CPT | Performed by: PSYCHIATRY & NEUROLOGY

## 2021-10-11 PROCEDURE — 99232 SBSQ HOSP IP/OBS MODERATE 35: CPT | Performed by: STUDENT IN AN ORGANIZED HEALTH CARE EDUCATION/TRAINING PROGRAM

## 2021-10-11 PROCEDURE — 80076 HEPATIC FUNCTION PANEL: CPT | Performed by: PSYCHIATRY & NEUROLOGY

## 2021-10-11 RX ADMIN — QUETIAPINE FUMARATE 100 MG: 100 TABLET ORAL at 19:55

## 2021-10-11 RX ADMIN — NICOTINE POLACRILEX 2 MG: 2 GUM, CHEWING ORAL at 07:42

## 2021-10-11 RX ADMIN — CHOLECALCIFEROL TAB 25 MCG (1000 UNIT) 1000 UNITS: 25 TAB at 08:30

## 2021-10-11 RX ADMIN — MELATONIN TAB 3 MG 3 MG: 3 TAB at 21:20

## 2021-10-11 RX ADMIN — QUETIAPINE FUMARATE 100 MG: 100 TABLET ORAL at 14:03

## 2021-10-11 RX ADMIN — DIVALPROEX SODIUM 1250 MG: 500 TABLET, EXTENDED RELEASE ORAL at 19:55

## 2021-10-11 RX ADMIN — NICOTINE POLACRILEX 2 MG: 2 GUM, CHEWING ORAL at 19:55

## 2021-10-11 RX ADMIN — QUETIAPINE FUMARATE 100 MG: 100 TABLET ORAL at 08:30

## 2021-10-11 RX ADMIN — NICOTINE POLACRILEX 2 MG: 2 GUM, CHEWING ORAL at 05:42

## 2021-10-11 RX ADMIN — NICOTINE POLACRILEX 2 MG: 2 GUM, CHEWING ORAL at 12:57

## 2021-10-11 RX ADMIN — NICOTINE POLACRILEX 2 MG: 2 GUM, CHEWING ORAL at 17:48

## 2021-10-12 VITALS
RESPIRATION RATE: 16 BRPM | WEIGHT: 202.6 LBS | TEMPERATURE: 97.9 F | BODY MASS INDEX: 26 KG/M2 | HEIGHT: 74 IN | OXYGEN SATURATION: 100 % | DIASTOLIC BLOOD PRESSURE: 63 MMHG | SYSTOLIC BLOOD PRESSURE: 112 MMHG | HEART RATE: 74 BPM

## 2021-10-12 PROBLEM — Z00.8 MEDICAL CLEARANCE FOR PSYCHIATRIC ADMISSION: Status: RESOLVED | Noted: 2021-10-01 | Resolved: 2021-10-12

## 2021-10-12 PROBLEM — G47.00 INSOMNIA: Status: ACTIVE | Noted: 2021-10-12

## 2021-10-12 PROCEDURE — 99238 HOSP IP/OBS DSCHRG MGMT 30/<: CPT | Performed by: STUDENT IN AN ORGANIZED HEALTH CARE EDUCATION/TRAINING PROGRAM

## 2021-10-12 RX ORDER — DIVALPROEX SODIUM 250 MG/1
1250 TABLET, EXTENDED RELEASE ORAL
Qty: 150 TABLET | Refills: 1 | Status: SHIPPED | OUTPATIENT
Start: 2021-10-12

## 2021-10-12 RX ORDER — LANOLIN ALCOHOL/MO/W.PET/CERES
3 CREAM (GRAM) TOPICAL
Qty: 30 TABLET | Refills: 1 | Status: SHIPPED | OUTPATIENT
Start: 2021-10-12

## 2021-10-12 RX ORDER — QUETIAPINE FUMARATE 100 MG/1
100 TABLET, FILM COATED ORAL 3 TIMES DAILY
Qty: 90 TABLET | Refills: 1 | Status: SHIPPED | OUTPATIENT
Start: 2021-10-12

## 2021-10-12 RX ADMIN — NICOTINE POLACRILEX 2 MG: 2 GUM, CHEWING ORAL at 06:28

## 2021-10-12 RX ADMIN — NICOTINE POLACRILEX 2 MG: 2 GUM, CHEWING ORAL at 12:15

## 2021-10-12 RX ADMIN — QUETIAPINE FUMARATE 100 MG: 100 TABLET ORAL at 08:25

## 2021-10-12 RX ADMIN — NICOTINE POLACRILEX 2 MG: 2 GUM, CHEWING ORAL at 08:55

## 2021-10-12 RX ADMIN — IBUPROFEN 600 MG: 600 TABLET, FILM COATED ORAL at 09:33

## 2021-10-12 RX ADMIN — CHOLECALCIFEROL TAB 25 MCG (1000 UNIT) 1000 UNITS: 25 TAB at 08:26

## 2022-06-27 ENCOUNTER — HOSPITAL ENCOUNTER (EMERGENCY)
Facility: HOSPITAL | Age: 32
Discharge: HOME | End: 2022-06-27
Attending: EMERGENCY MEDICINE | Admitting: EMERGENCY MEDICINE
Payer: OTHER MISCELLANEOUS

## 2022-06-27 VITALS
TEMPERATURE: 98 F | HEART RATE: 113 BPM | DIASTOLIC BLOOD PRESSURE: 90 MMHG | OXYGEN SATURATION: 98 % | RESPIRATION RATE: 16 BRPM | SYSTOLIC BLOOD PRESSURE: 145 MMHG

## 2022-06-27 DIAGNOSIS — Z78.9 DIPHTHERIA-PERTUSSIS-TETANUS (DPT) VACCINATION NOT UP TO DATE: ICD-10-CM

## 2022-06-27 DIAGNOSIS — T14.8XXA DEEP WOUND OF SKIN DUE TO AVULSION: Primary | ICD-10-CM
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
## 2022-06-27 PROCEDURE — 90471 IMMUNIZATION ADMIN: CPT

## 2022-06-27 PROCEDURE — 90715 TDAP VACCINE 7 YRS/> IM: CPT | Performed by: PHYSICIAN ASSISTANT

## 2022-06-27 PROCEDURE — 99284 EMERGENCY DEPT VISIT MOD MDM: CPT | Performed by: PHYSICIAN ASSISTANT

## 2022-06-27 PROCEDURE — 99283 EMERGENCY DEPT VISIT LOW MDM: CPT

## 2022-06-27 RX ORDER — GINSENG 100 MG
1 CAPSULE ORAL DAILY
Qty: 28 G | Refills: 0 | Status: SHIPPED | OUTPATIENT
Start: 2022-06-27

## 2022-06-27 RX ORDER — LIDOCAINE HYDROCHLORIDE 10 MG/ML
10 INJECTION, SOLUTION EPIDURAL; INFILTRATION; INTRACAUDAL; PERINEURAL ONCE
Status: DISCONTINUED | OUTPATIENT
Start: 2022-06-27 | End: 2022-06-27 | Stop reason: HOSPADM

## 2022-06-27 RX ADMIN — TETANUS TOXOID, REDUCED DIPHTHERIA TOXOID AND ACELLULAR PERTUSSIS VACCINE, ADSORBED 0.5 ML: 5; 2.5; 8; 8; 2.5 SUSPENSION INTRAMUSCULAR at 13:41

## 2022-06-27 NOTE — ED PROVIDER NOTES
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 Taking? QUEtiapine (SEROquel) 100 mg tablet   No No   Sig: Take 1 tablet (100 mg total) by mouth 3 (three) times a day   divalproex sodium (DEPAKOTE ER) 250 mg 24 hr tablet   No No   Sig: Take 5 tablets (1,250 mg total) by mouth daily after dinner   melatonin 3 mg   No No   Sig: Take 1 tablet (3 mg total) by mouth daily at bedtime      Facility-Administered Medications: None       Past Medical History:   Diagnosis Date    Bipolar disorder ([facility] [address] )     Depression     Psychiatric illness        History reviewed  No pertinent surgical history  History reviewed  No pertinent family history  I have reviewed and agree with the history as documented  E-Cigarette/Vaping    E-Cigarette Use Current Every Day User      E-Cigarette/Vaping Substances    Nicotine Yes      Social History     Tobacco Use    Smoking status: Current Some Day Smoker     Packs/day: 0 50     Types: Cigarettes    Smokeless tobacco: Never Used   Vaping Use    Vaping Use: Every day    Substances: Nicotine   Substance Use Topics    Alcohol use: Never    Drug use: Yes     Types: Marijuana       Review of Systems   Constitutional: Negative for activity change, chills, fatigue, fever and unexpected weight change  HENT: Negative for congestion, postnasal drip and rhinorrhea  Eyes: Negative for visual disturbance     Respiratory: Negative for cough, chest tightness, shortness of breath, wheezing and stridor  Cardiovascular: Negative for chest pain and leg swelling  Gastrointestinal: Negative for abdominal pain, diarrhea, nausea and vomiting  Endocrine: Negative for cold intolerance and heat intolerance  Genitourinary: Negative for flank pain  Musculoskeletal: Negative for arthralgias, joint swelling and myalgias  Left distal thumb skin avulsion   Skin: Negative for color change  Allergic/Immunologic: Negative for environmental allergies  Neurological: Negative for syncope and light-headedness  Hematological: Negative for adenopathy  Psychiatric/Behavioral: Negative for confusion  Physical Exam  Physical Exam  Vitals and nursing note reviewed  Constitutional:       Appearance: He is well-developed  HENT:      Head: Normocephalic and atraumatic  Eyes:      Conjunctiva/sclera: Conjunctivae normal    Cardiovascular:      Rate and Rhythm: Normal rate and regular rhythm  Heart sounds: No murmur heard  Pulmonary:      Effort: Pulmonary effort is normal  No respiratory distress  Breath sounds: Normal breath sounds  Abdominal:      Palpations: Abdomen is soft  Tenderness: There is no abdominal tenderness  Musculoskeletal:      Left hand: Laceration (Qualified as left thumb distal tip skin avulsion) present  No swelling or deformity  Normal strength  Normal sensation  Normal capillary refill  Normal pulse  Cervical back: Neck supple  Skin:     General: Skin is warm and dry  Neurological:      Mental Status: He is alert           Vital Signs  ED Triage Vitals [06/27/22 1237]   Temperature Pulse Respirations Blood Pressure SpO2   98 °F (36 7 °C) (!) 113 16 145/90 98 %      Temp Source Heart Rate Source Patient Position - Orthostatic VS BP Location FiO2 (%)   Temporal Monitor Sitting Right arm --      Pain Score       1           Vitals:    06/27/22 1237   BP: 145/90   Pulse: (!) 113 Patient Position - Orthostatic VS: Sitting         Visual Acuity      ED Medications  Medications   tetanus-diphtheria-acellular pertussis (BOOSTRIX) IM injection 0 5 mL (0 5 mL Intramuscular Given 6/27/22 1341)       Diagnostic Studies  Results Reviewed     None                 No orders to display              Procedures  General Procedure    Date/Time: 6/27/2022 1:07 PM  Performed by: Yaritza Merritt PA-C  Authorized by: Yaritza Merritt PA-C     Patient location:  ED  Assisting Provider(s): No    Consent:     Consent obtained:  Verbal    Consent given by:  Patient    Risks discussed:  Bleeding, infection and pain  Anesthesia (see MAR for exact dosages): Anesthesia method:  None  Procedure Detail:     Procedure note (site, laterality, method, findings):  Left thumb distal tip wound dressing with surgifoam, 2 x 2, and gauze dressing          ED Course                               SBIRT 20yo+    Flowsheet Row Most Recent Value   SBIRT (25 yo +)    In order to provide better care to our patients, we are screening all of our patients for alcohol and drug use  Would it be okay to ask you these screening questions? Yes Filed at: 06/27/2022 1357   Initial Alcohol Screen: US AUDIT-C     1  How often do you have a drink containing alcohol? 0 Filed at: 06/27/2022 1357   2  How many drinks containing alcohol do you have on a typical day you are drinking? 0 Filed at: 06/27/2022 1357   3a  Male UNDER 65: How often do you have five or more drinks on one occasion? 0 Filed at: 06/27/2022 1357   3b  FEMALE Any Age, or MALE 65+: How often do you have 4 or more drinks on one occassion? 0 Filed at: 06/27/2022 1357   Audit-C Score 0 Filed at: 06/27/2022 1357   LORY: How many times in the past year have you    Used an illegal drug or used a prescription medication for non-medical reasons?  Never Filed at: 06/27/2022 1357                    MDM  Number of Diagnoses or Management Options  Diphtheria-pertussis-tetanus (DPT) vaccination not up to date: minor  Knife wound: minor      Disposition  Final diagnoses:   Deep wound of skin due to avulsion   Knife wound   Diphtheria-pertussis-tetanus (DPT) vaccination not up to date     Time reflects when diagnosis was documented in both MDM as applicable and the Disposition within this note     Time User Action Codes Description Comment    6/27/2022  1:45 PM Efe Callowayda  [address]  8XXA] Deep wound of skin due to avulsion     6/27/2022  1:46 PM Gagan Daniel Add [W13  0XXA] Knife wound     6/27/2022  1:49 PM Gagan Daniel Add [Z78 9] Diphtheria-pertussis-tetanus (DPT) vaccination not up to date       ED Disposition     ED Disposition   Discharge    Condition   Good    Date/Time   Mon Jun 27, 2022  1:49 PM    Comment   Pino John discharge to home/self care  Follow-up Information     Follow up With Specialties Details Why Contact Info Additional Ina Muro [address] [facility] Emergency Medicine Go to  If development of signs and symptoms of infection  Wound will self heal with wound granulation over course of 2-4 weeks   [address] [place], [id number]  [address] [facility], [address] [place] [address], [facility] [address]          Discharge Medication List as of 6/27/2022  1:50 PM      START taking these medications    Details   bacitracin topical ointment 500 units/g topical ointment Apply 1 large application topically in the morning, Starting Mon 6/27/2022, Normal         CONTINUE these medications which have NOT CHANGED    Details   divalproex sodium (DEPAKOTE ER) 250 mg 24 hr tablet Take 5 tablets (1,250 mg total) by mouth daily after dinner, Starting Tue 10/12/2021, Normal      melatonin 3 mg Take 1 tablet (3 mg total) by mouth daily at bedtime, Starting Tue 10/12/2021, Normal      QUEtiapine (SEROquel) 100 mg tablet Take 1 tablet (100 mg total) by mouth 3 (three) times a day, Starting Tue 10/12/2021, Normal             No discharge procedures on file      PDMP Review       Value Time User    PDMP Reviewed  Yes 10/12/2021  9:52 AM Deneen Alexander MD          ED Provider  Electronically Signed by           Marya Valencia PA-C  06/28/22 5038

## 2023-09-01 ENCOUNTER — HOSPITAL ENCOUNTER (EMERGENCY)
Facility: HOSPITAL | Age: 33
Discharge: HOME | End: 2023-09-02
Attending: EMERGENCY MEDICINE
Payer: OTHER MISCELLANEOUS

## 2023-09-01 ENCOUNTER — APPOINTMENT (EMERGENCY)
Dept: RADIOLOGY | Facility: HOSPITAL | Age: 33
End: 2023-09-01
Payer: OTHER MISCELLANEOUS

## 2023-09-01 VITALS
HEART RATE: 70 BPM | SYSTOLIC BLOOD PRESSURE: 148 MMHG | TEMPERATURE: 98.4 F | OXYGEN SATURATION: 100 % | RESPIRATION RATE: 18 BRPM | DIASTOLIC BLOOD PRESSURE: 79 MMHG

## 2023-09-01 DIAGNOSIS — S63.611A SPRAIN OF LEFT INDEX FINGER, INITIAL ENCOUNTER: Primary | ICD-10-CM

## 2023-09-01 PROCEDURE — 73140 X-RAY EXAM OF FINGER(S): CPT

## 2023-09-01 PROCEDURE — 99284 EMERGENCY DEPT VISIT MOD MDM: CPT | Performed by: EMERGENCY MEDICINE

## 2023-09-01 PROCEDURE — 99283 EMERGENCY DEPT VISIT LOW MDM: CPT

## 2023-09-01 PROCEDURE — NC001 PR NO CHARGE: Performed by: EMERGENCY MEDICINE

## 2023-09-01 RX ORDER — IBUPROFEN 400 MG/1
400 TABLET ORAL ONCE
Status: COMPLETED | OUTPATIENT
Start: 2023-09-02 | End: 2023-09-02

## 2023-09-01 NOTE — Clinical Note
Sara Casey was seen and treated in our emergency department on 9/1/2023.            no use of left hand until follow-up    Diagnosis:     Megha Young  . He may return on this date: If you have any questions or concerns, please don't hesitate to call.       Aditi Campa, DO    ______________________________           _______________          _______________  Hospital Representative                              Date                                Time

## 2023-09-02 RX ADMIN — IBUPROFEN 400 MG: 400 TABLET, FILM COATED ORAL at 00:04

## 2023-09-02 NOTE — ED PROVIDER NOTES
History  Chief Complaint   Patient presents with   • Finger Pain     L 2nd finger pain. 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 States has been swelling and is unable to fully straighten it since. Denies meds for pain     Patient complains of left index finger swelling and limited ROM about 4-6 hours now. He was working at [employer] and lifted a plate when he felt a twinge at the finger. He was able to keep working but then couple hours later noticed pain swelling and limited ROM of that finger around the PIP joint. He did not take any medication. Prior to Admission Medications   Prescriptions Last Dose Informant Patient Reported? Taking? QUEtiapine (SEROquel) 100 mg tablet   No No   Sig: Take 1 tablet (100 mg total) by mouth 3 (three) times a day   bacitracin topical ointment 500 units/g topical ointment   No No   Sig: Apply 1 large application topically in the morning   divalproex sodium (DEPAKOTE ER) 250 mg 24 hr tablet   No No   Sig: Take 5 tablets (1,250 mg total) by mouth daily after dinner   melatonin 3 mg   No No   Sig: Take 1 tablet (3 mg total) by mouth daily at bedtime      Facility-Administered Medications: None       Past Medical History:   Diagnosis Date   • Bipolar disorder ([address])    • Depression    • Psychiatric illness        History reviewed. No pertinent surgical history. History reviewed. No pertinent family history. I have reviewed and agree with the history as documented.     E-Cigarette/Vaping   • E-Cigarette Use Current Every Day User      E-Cigarette/Vaping Substances   • Nicotine Yes      Social History     Tobacco Use   • Smoking status: Some Days     Packs/day: 0.50     Types: Cigarettes   • Smokeless tobacco: Never   Vaping Use   • Vaping Use: Every day   • Substances: Nicotine   Substance Use Topics   • Alcohol use: Never   • Drug use: Yes     Types: Marijuana       Review of Systems   Constitutional: Negative for chills and fever.   HENT: Negative for rhinorrhea and sore throat. Respiratory: Negative for shortness of breath. Cardiovascular: Negative for chest pain. Gastrointestinal: Negative for constipation, diarrhea, nausea and vomiting. Genitourinary: Negative for dysuria and frequency. Skin: Negative for rash. All other systems reviewed and are negative. Physical Exam  Physical Exam  Vitals and nursing note reviewed. Constitutional:       Appearance: He is well-developed. HENT:      Head: Normocephalic and atraumatic. Right Ear: External ear normal.      Left Ear: External ear normal.      Nose: Nose normal.   Eyes:      Conjunctiva/sclera: Conjunctivae normal.      Pupils: Pupils are equal, round, and reactive to light. Cardiovascular:      Rate and Rhythm: Normal rate and regular rhythm. Heart sounds: Normal heart sounds. Pulmonary:      Effort: Pulmonary effort is normal. No respiratory distress. Breath sounds: Normal breath sounds. No wheezing. Abdominal:      General: Bowel sounds are normal. There is no distension. Palpations: Abdomen is soft. Tenderness: There is no abdominal tenderness. Musculoskeletal:      Left hand: Swelling, deformity and tenderness present. Decreased range of motion. Normal strength. Normal sensation. Normal capillary refill. Normal pulse. Hands:       Cervical back: Normal range of motion and neck supple. No spinous process tenderness. Skin:     General: Skin is warm and dry. Findings: No rash. Neurological:      General: No focal deficit present. Mental Status: He is alert. GCS: GCS eye subscore is 4. GCS verbal subscore is 5. GCS motor subscore is 6. Sensory: No sensory deficit.    Psychiatric:         Mood and Affect: Mood normal.         Vital Signs  ED Triage Vitals [09/01/23 2326]   Temperature Pulse Respirations Blood Pressure SpO2   98.4 °F (36.9 °C) 70 18 148/79 100 %      Temp Source Heart Rate Source Patient Position - Orthostatic VS BP Location FiO2 (%)   Oral Monitor Sitting Right arm --      Pain Score       No Pain           Vitals:    09/01/23 2326   BP: 148/79   Pulse: 70   Patient Position - Orthostatic VS: Sitting         Visual Acuity      ED Medications  Medications   ibuprofen (MOTRIN) tablet 400 mg (400 mg Oral Given 9/2/23 0004)       Diagnostic Studies  Results Reviewed     None                 XR finger second digit - index LEFT    (Results Pending)              Procedures  Splint application    Date/Time: 9/2/2023 12:02 AM    Performed by: Timo Monreal DO  Authorized by: Timo Monreal DO  [place] Protocol:  Consent: Verbal consent obtained. Consent given by: patient      Pre-procedure details:     Sensation:  Numbness  Procedure details:     Laterality:  Left    Location:  Finger    Finger:  L index finger    Splint type:  Finger splint, static    Supplies:  Aluminum splint             ED Course                               SBIRT 20yo+    Flowsheet Row Most Recent Value   Initial Alcohol Screen: US AUDIT-C     1. How often do you have a drink containing alcohol? 0 Filed at: 09/01/2023 2328   2. How many drinks containing alcohol do you have on a typical day you are drinking? 0 Filed at: 09/01/2023 2328   3a. Male UNDER 65: How often do you have five or more drinks on one occasion? 0 Filed at: 09/01/2023 2328   Audit-C Score 0 Filed at: 09/01/2023 2328   LORY: How many times in the past year have you. .. Used an illegal drug or used a prescription medication for non-medical reasons? Never Filed at: 09/01/2023 2328                    Medical Decision Making  Differential includes left index finger PIP joint sprain. Less likely extensor tendon laceration or partial tear. Amount and/or Complexity of Data Reviewed  Radiology: ordered. Risk  Prescription drug management.           Disposition  Final diagnoses:   Sprain of left index finger, initial encounter - PIP joint     Time reflects when diagnosis was documented in both MDM as applicable and the Disposition within this note     Time User Action Codes Description Comment    9/2/2023 12:01 AM Michelle Sumner Add [M95.160T] Sprain of left index finger, initial encounter     9/2/2023 12:01 AM Michelle Sumner Modify [T87.192P] Sprain of left index finger, initial encounter PIP joint      ED Disposition     ED Disposition   Discharge    Condition   Stable    Date/Time   Sat Sep 2, 2023 12:01 AM    Comment   Joanna Rubalcava discharge to home/self care.                Follow-up Information     Follow up With Specialties Details Why Contact Info Additional Information    [address] Specialists [address] [facility] Orthopedic Surgery Call   [address] [facility] [phone]  [facility] [place] [facility] [address] [facility], [address] [facility] [address] [place] [address] [address] [place], [place], [address]          Discharge Medication List as of 9/2/2023 12:03 AM      CONTINUE these medications which have NOT CHANGED    Details   bacitracin topical ointment 500 units/g topical ointment Apply 1 large application topically in the morning, Starting Mon 6/27/2022, Normal      divalproex sodium (DEPAKOTE ER) 250 mg 24 hr tablet Take 5 tablets (1,250 mg total) by mouth daily after dinner, Starting Tue 10/12/2021, Normal      melatonin 3 mg Take 1 tablet (3 mg total) by mouth daily at bedtime, Starting Tue 10/12/2021, Normal      QUEtiapine (SEROquel) 100 mg tablet Take 1 tablet (100 mg total) by mouth 3 (three) times a day, Starting Tue 10/12/2021, Normal                 PDMP Review       Value Time User    PDMP Reviewed  Yes 10/12/2021  9:52 AM Rigoberto Wells MD          ED Provider  Electronically Signed by           Kirit Wilcox DO  09/02/23 4603

## 2024-05-02 ENCOUNTER — APPOINTMENT (OUTPATIENT)
Dept: URGENT CARE | Facility: CLINIC | Age: 34
End: 2024-05-02

## 2024-05-02 ENCOUNTER — APPOINTMENT (OUTPATIENT)
Dept: PHYSICAL THERAPY | Facility: CLINIC | Age: 34
End: 2024-05-02

## 2024-05-02 PROCEDURE — 97530 THERAPEUTIC ACTIVITIES: CPT | Performed by: PHYSICAL THERAPIST

## 2025-05-07 ENCOUNTER — TELEPHONE (OUTPATIENT)
Age: 35
End: 2025-05-07

## 2025-05-07 NOTE — TELEPHONE ENCOUNTER
Patient has been added to the Medication Management wait list without a referral. Pt states that once he finds a Provider his PCP will send a referral. Pt is Bipolar and is in need of an evaluation to get some of his rights back. He is willing to do mulitple appts to make sure his diagnosis states that he is not a threat to the public. Pt on Medication for Bipolar and his PCP prescribes it.  [facility]  Insurance Type:    Commercial [x]   Medicaid []   [facility] (if applicable)   Medicare []  Location Preference: [place]  Provider Preference: Dr Jacinto Dawn  Virtual: Yes [] No [x]  Were outside resources sent: No [x]